# Patient Record
Sex: MALE | Race: ASIAN | Employment: FULL TIME | URBAN - METROPOLITAN AREA
[De-identification: names, ages, dates, MRNs, and addresses within clinical notes are randomized per-mention and may not be internally consistent; named-entity substitution may affect disease eponyms.]

---

## 2019-05-17 ENCOUNTER — APPOINTMENT (OUTPATIENT)
Dept: GENERAL RADIOLOGY | Age: 29
End: 2019-05-17
Payer: COMMERCIAL

## 2019-05-17 ENCOUNTER — APPOINTMENT (OUTPATIENT)
Dept: CT IMAGING | Age: 29
End: 2019-05-17
Payer: COMMERCIAL

## 2019-05-17 ENCOUNTER — HOSPITAL ENCOUNTER (EMERGENCY)
Age: 29
Discharge: HOME | End: 2019-05-18
Attending: EMERGENCY MEDICINE
Payer: COMMERCIAL

## 2019-05-17 VITALS
SYSTOLIC BLOOD PRESSURE: 130 MMHG | DIASTOLIC BLOOD PRESSURE: 69 MMHG | TEMPERATURE: 99.3 F | BODY MASS INDEX: 42.66 KG/M2 | HEART RATE: 94 BPM | WEIGHT: 315 LBS | RESPIRATION RATE: 18 BRPM | OXYGEN SATURATION: 91 % | HEIGHT: 72 IN

## 2019-05-17 DIAGNOSIS — R06.01 ORTHOPNEA: ICD-10-CM

## 2019-05-17 DIAGNOSIS — R06.00 NOCTURNAL DYSPNEA: ICD-10-CM

## 2019-05-17 DIAGNOSIS — R51.9 ACUTE NONINTRACTABLE HEADACHE, UNSPECIFIED HEADACHE TYPE: Primary | ICD-10-CM

## 2019-05-17 DIAGNOSIS — R06.02 SHORTNESS OF BREATH: ICD-10-CM

## 2019-05-17 DIAGNOSIS — R50.9 FEVER, UNSPECIFIED FEVER CAUSE: ICD-10-CM

## 2019-05-17 DIAGNOSIS — R05.9 COUGH: ICD-10-CM

## 2019-05-17 LAB
A/G RATIO: 0.9 (ref 1.1–2.2)
ALBUMIN SERPL-MCNC: 3.5 G/DL (ref 3.4–5)
ALP BLD-CCNC: 72 U/L (ref 40–129)
ALT SERPL-CCNC: 32 U/L (ref 10–40)
ANION GAP SERPL CALCULATED.3IONS-SCNC: 8 MMOL/L (ref 3–16)
APPEARANCE CSF: CLEAR
APPEARANCE CSF: CLEAR
AST SERPL-CCNC: 25 U/L (ref 15–37)
BASOPHILS ABSOLUTE: 0 K/UL (ref 0–0.2)
BASOPHILS RELATIVE PERCENT: 0.2 %
BILIRUB SERPL-MCNC: 0.3 MG/DL (ref 0–1)
BUN BLDV-MCNC: 8 MG/DL (ref 7–20)
CALCIUM SERPL-MCNC: 8.2 MG/DL (ref 8.3–10.6)
CHLORIDE BLD-SCNC: 102 MMOL/L (ref 99–110)
CLOT EVALUATION CSF: ABNORMAL
CLOT EVALUATION CSF: ABNORMAL
CO2: 26 MMOL/L (ref 21–32)
COLOR CSF: COLORLESS
COLOR CSF: COLORLESS
CREAT SERPL-MCNC: <0.5 MG/DL (ref 0.9–1.3)
EOSINOPHILS ABSOLUTE: 0.1 K/UL (ref 0–0.6)
EOSINOPHILS RELATIVE PERCENT: 0.6 %
GFR AFRICAN AMERICAN: >60
GFR NON-AFRICAN AMERICAN: >60
GLOBULIN: 3.8 G/DL
GLUCOSE BLD-MCNC: 149 MG/DL (ref 70–99)
GLUCOSE, CSF: 92 MG/DL (ref 40–80)
HCT VFR BLD CALC: 43.6 % (ref 40.5–52.5)
HEMOGLOBIN: 14.3 G/DL (ref 13.5–17.5)
LYMPHOCYTES ABSOLUTE: 2.1 K/UL (ref 1–5.1)
LYMPHOCYTES RELATIVE PERCENT: 17.9 %
MCH RBC QN AUTO: 30.8 PG (ref 26–34)
MCHC RBC AUTO-ENTMCNC: 32.9 G/DL (ref 31–36)
MCV RBC AUTO: 93.4 FL (ref 80–100)
MONOCYTES ABSOLUTE: 1.2 K/UL (ref 0–1.3)
MONOCYTES RELATIVE PERCENT: 10.2 %
NEUTROPHILS ABSOLUTE: 8.5 K/UL (ref 1.7–7.7)
NEUTROPHILS RELATIVE PERCENT: 71.1 %
NO DIFFERENTIAL CSF: ABNORMAL
NO DIFFERENTIAL CSF: ABNORMAL
PDW BLD-RTO: 14.7 % (ref 12.4–15.4)
PLATELET # BLD: 202 K/UL (ref 135–450)
PMV BLD AUTO: 8.8 FL (ref 5–10.5)
POTASSIUM SERPL-SCNC: 3.9 MMOL/L (ref 3.5–5.1)
PROTEIN CSF: 21 MG/DL (ref 15–45)
RAPID INFLUENZA  B AGN: NEGATIVE
RAPID INFLUENZA A AGN: NEGATIVE
RBC # BLD: 4.66 M/UL (ref 4.2–5.9)
RBC CSF: 2 /CUMM
RBC CSF: 9 /CUMM
SODIUM BLD-SCNC: 136 MMOL/L (ref 136–145)
TOTAL PROTEIN: 7.3 G/DL (ref 6.4–8.2)
TUBE NUMBER CSF: ABNORMAL
VOLUME CSF: 1 ML
WBC # BLD: 11.9 K/UL (ref 4–11)
WBC CSF: 0 /CUMM (ref 0–5)
WBC CSF: 0 /CUMM (ref 0–5)

## 2019-05-17 PROCEDURE — 70450 CT HEAD/BRAIN W/O DYE: CPT

## 2019-05-17 PROCEDURE — 94640 AIRWAY INHALATION TREATMENT: CPT

## 2019-05-17 PROCEDURE — 99284 EMERGENCY DEPT VISIT MOD MDM: CPT

## 2019-05-17 PROCEDURE — 89050 BODY FLUID CELL COUNT: CPT

## 2019-05-17 PROCEDURE — 87070 CULTURE OTHR SPECIMN AEROBIC: CPT

## 2019-05-17 PROCEDURE — 94761 N-INVAS EAR/PLS OXIMETRY MLT: CPT

## 2019-05-17 PROCEDURE — 82945 GLUCOSE OTHER FLUID: CPT

## 2019-05-17 PROCEDURE — 87205 SMEAR GRAM STAIN: CPT

## 2019-05-17 PROCEDURE — 80053 COMPREHEN METABOLIC PANEL: CPT

## 2019-05-17 PROCEDURE — 71046 X-RAY EXAM CHEST 2 VIEWS: CPT

## 2019-05-17 PROCEDURE — 85025 COMPLETE CBC W/AUTO DIFF WBC: CPT

## 2019-05-17 PROCEDURE — 84157 ASSAY OF PROTEIN OTHER: CPT

## 2019-05-17 PROCEDURE — 96360 HYDRATION IV INFUSION INIT: CPT

## 2019-05-17 PROCEDURE — 2580000003 HC RX 258: Performed by: EMERGENCY MEDICINE

## 2019-05-17 PROCEDURE — 6370000000 HC RX 637 (ALT 250 FOR IP): Performed by: EMERGENCY MEDICINE

## 2019-05-17 PROCEDURE — 94664 DEMO&/EVAL PT USE INHALER: CPT

## 2019-05-17 PROCEDURE — 87804 INFLUENZA ASSAY W/OPTIC: CPT

## 2019-05-17 RX ORDER — LIDOCAINE HYDROCHLORIDE 10 MG/ML
30 INJECTION, SOLUTION INFILTRATION; PERINEURAL ONCE
Status: DISCONTINUED | OUTPATIENT
Start: 2019-05-17 | End: 2019-05-18 | Stop reason: HOSPADM

## 2019-05-17 RX ORDER — 0.9 % SODIUM CHLORIDE 0.9 %
1000 INTRAVENOUS SOLUTION INTRAVENOUS ONCE
Status: COMPLETED | OUTPATIENT
Start: 2019-05-17 | End: 2019-05-17

## 2019-05-17 RX ORDER — TRAMADOL HYDROCHLORIDE 50 MG/1
50 TABLET ORAL EVERY 6 HOURS PRN
Qty: 12 TABLET | Refills: 0 | Status: SHIPPED | OUTPATIENT
Start: 2019-05-17 | End: 2019-05-20

## 2019-05-17 RX ORDER — IPRATROPIUM BROMIDE AND ALBUTEROL SULFATE 2.5; .5 MG/3ML; MG/3ML
1 SOLUTION RESPIRATORY (INHALATION) ONCE
Status: COMPLETED | OUTPATIENT
Start: 2019-05-17 | End: 2019-05-17

## 2019-05-17 RX ADMIN — SODIUM CHLORIDE 1000 ML: 9 INJECTION, SOLUTION INTRAVENOUS at 20:51

## 2019-05-17 RX ADMIN — IPRATROPIUM BROMIDE AND ALBUTEROL SULFATE 1 AMPULE: .5; 3 SOLUTION RESPIRATORY (INHALATION) at 21:19

## 2019-05-17 SDOH — HEALTH STABILITY: MENTAL HEALTH: HOW OFTEN DO YOU HAVE A DRINK CONTAINING ALCOHOL?: NEVER

## 2019-05-17 NOTE — LETTER
[facility] Emergency [facility]  [address] [facility] [address]  Phone: [phone]               May 18, 2019    Patient: Nick Lewis   YOB: 1990   Date of Visit: 5/17/2019       To Whom It May Concern:    Nick Lewis was seen and treated in our emergency department on 5/17/2019. He may return to work on 5/19/2019.       Sincerely,       Registered Nurse         Signature:__________________________________

## 2019-05-17 NOTE — ED PROVIDER NOTES
interspace. A 24 gauge spinal needle was inserted through the L4 interspace and 4mL of clear CSF was sent to the lab for analysis. Amanda Toth rested after the procedure. There were no complications during the procedure. Radiology  CT Head WO Contrast   Final Result   No acute intracranial abnormality. XR CHEST STANDARD (2 VW)   Final Result   Cardiomegaly. No radiographic evidence of acute pulmonary disease.              Labs  Results for orders placed or performed during the hospital encounter of 05/17/19   Rapid influenza A/B antigens   Result Value Ref Range    Rapid Influenza A Ag Negative Negative    Rapid Influenza B Ag Negative Negative   CSF culture   Result Value Ref Range    Gram Stain Result       Cytospin performed,no quantitation  WBC's (Polymorphonuclear)  WBC's (Mononuclear)  No organisms seen     CBC Auto Differential   Result Value Ref Range    WBC 11.9 (H) 4.0 - 11.0 K/uL    RBC 4.66 4.20 - 5.90 M/uL    Hemoglobin 14.3 13.5 - 17.5 g/dL    Hematocrit 43.6 40.5 - 52.5 %    MCV 93.4 80.0 - 100.0 fL    MCH 30.8 26.0 - 34.0 pg    MCHC 32.9 31.0 - 36.0 g/dL    RDW 14.7 12.4 - 15.4 %    Platelets 422 697 - 610 K/uL    MPV 8.8 5.0 - 10.5 fL    Neutrophils % 71.1 %    Lymphocytes % 17.9 %    Monocytes % 10.2 %    Eosinophils % 0.6 %    Basophils % 0.2 %    Neutrophils # 8.5 (H) 1.7 - 7.7 K/uL    Lymphocytes # 2.1 1.0 - 5.1 K/uL    Monocytes # 1.2 0.0 - 1.3 K/uL    Eosinophils # 0.1 0.0 - 0.6 K/uL    Basophils # 0.0 0.0 - 0.2 K/uL   Comprehensive Metabolic Panel   Result Value Ref Range    Sodium 136 136 - 145 mmol/L    Potassium 3.9 3.5 - 5.1 mmol/L    Chloride 102 99 - 110 mmol/L    CO2 26 21 - 32 mmol/L    Anion Gap 8 3 - 16    Glucose 149 (H) 70 - 99 mg/dL    BUN 8 7 - 20 mg/dL    CREATININE <0.5 (L) 0.9 - 1.3 mg/dL    GFR Non-African American >60 >60    GFR African American >60 >60    Calcium 8.2 (L) 8.3 - 10.6 mg/dL    Total Protein 7.3 6.4 - 8.2 g/dL    Alb 3.5 3.4 - 5.0 g/dL Albumin/Globulin Ratio 0.9 (L) 1.1 - 2.2    Total Bilirubin 0.3 0.0 - 1.0 mg/dL    Alkaline Phosphatase 72 40 - 129 U/L    ALT 32 10 - 40 U/L    AST 25 15 - 37 U/L    Globulin 3.8 g/dL   CSF Cell Count with Differential   Result Value Ref Range    Color, CSF Colorless Colorless    Appearance, CSF Clear Clear    Tube Number + CELL CT + DIFF-CSF Tube 1     Clot Evaluation CSF see below     WBC, CSF 0 0 - 5 /cumm    RBC, CSF 9 (A) 0 /cumm    No differential CSF see below    CSF Cell Count with Differential   Result Value Ref Range    Color, CSF Colorless Colorless    Appearance, CSF Clear Clear    Tube Number + CELL CT + DIFF-CSF Tube 4     Clot Evaluation CSF see below     WBC, CSF 0 0 - 5 /cumm    RBC, CSF 2 (A) 0 /cumm    No differential CSF see below    Glucose, CSF   Result Value Ref Range    Glucose, CSF 92 (H) 40 - 80 mg/dL    Tube Number + CELL CT + DIFF-CSF Tube 2     Volume Csf 1 mL   Protein, CSF   Result Value Ref Range    Protein, CSF 21 15 - 45 mg/dL       MDM and ED Course  The patient's symptoms seem most consistent with a viral infection and pneumonia, although the headache was most prominent in his history. With describing it as the worst headache of his life, we discussed serious possibilities such as meningitis and subarachnoid hemorrhage. After discussing the risks, benefits, and alternatives to performing LP along with CT of his head, the patient decided that he would want a full workup, including a lumbar puncture. Consent was verified with the nurse and the LP was performed as indicated above. Although the patient's pain was significant, he declined pain medication throughout his visit multiple times. Reportedly, his workup. It was normal.  His CT head was without acute abnormality. His LP showed no evidence of meningitis, intracranial hemorrhage, or other serious diagnosis. Of note, the patient did develop hypoxia multiple times during his visit.   This seemed mostly to occur whenever he was lying flat. Given his history, I suspect sleep apnea. The patient also suspects that he has a somewhat like to see a sleep doctor. I placed a referral for this today. Whenever I would sit the patient up, his oxygen level would improve. Given the remainder of his workup showed no evidence of serious respiratory disease, believe is unlikely that he has another cause. I estimate there is LOW risk for SUBARACHNOID HEMORRHAGE, BACTERIAL MENINGITIS, INTRACRANIAL HEMORRHAGE, SUBDURAL HEMATOMA, SKULL FRACTURE, TIA, STROKE, SEPSIS, PNEUMONIA, CELLULITIS, PERITONITIS/APPENDICITIS, MYOCARDITIS, ENDOCARDITIS, and other serious infection or cause of headache, thus I consider the discharge disposition reasonable. Nano Pichardo and I have discussed the diagnosis and risks, and we agree with discharging home to follow-up with their primary doctor. We also discussed returning to the Emergency Department immediately if new or worsening symptoms occur. We have discussed the symptoms which are most concerning (e.g., changing or worsening pain, weakness, vomiting, fever, shortness of breath, chest pain, etc) that necessitate immediate return. Final Impression  1. Acute nonintractable headache, unspecified headache type    2. Cough    3. Shortness of breath    4. Fever, unspecified fever cause    5. Nocturnal dyspnea    6. Orthopnea        Blood pressure 130/69, pulse 94, temperature 99.3 °F (37.4 °C), resp. rate 18, height 6' (1.829 m), weight (!) 365 lb (165.6 kg), SpO2 91 %.      Disposition:  Discharge    Patient Referrals:  Ritesh Lizarraga MD  [address]  [phone]    Schedule an appointment as soon as possible for a visit   Sleep Medicine for evaluation for sleep apnea    [address] [place] [address]  In 2 days  You will receive a call to assist you, in setting up a primary care physician, for re-evaluation    [facility] [address] [facility]  [phone]    As needed, If symptoms worsen or new symptoms develop      Discharge Medications:  New Prescriptions    TRAMADOL (ULTRAM) 50 MG TABLET    Take 1 tablet by mouth every 6 hours as needed for Pain for up to 3 days. This chart was generated using the [address] [place] [address] [employer] system. I created this record but it may contain dictation errors given the limitations of this technology.         Everet Frankel, MD  05/18/19 0870

## 2019-05-18 NOTE — ED NOTES
Discharge instructions reviewed with pt. Pt verbalized understanding. Peripheral IV removed without complications. Pt given copy of discharge instructions.      Raad Louis RN  05/18/19 6635

## 2019-05-18 NOTE — ED NOTES
Lumbar puncture consent signed, pt LP done by Dr. Iqra Christensen. Pt CSF fluid labs taken over to lab by [facility].       Edna Dempsey RN  05/17/19 5127

## 2019-05-23 ENCOUNTER — APPOINTMENT (OUTPATIENT)
Dept: GENERAL RADIOLOGY | Age: 29
End: 2019-05-23
Payer: COMMERCIAL

## 2019-05-23 ENCOUNTER — HOSPITAL ENCOUNTER (EMERGENCY)
Age: 29
Discharge: HOME | End: 2019-05-23
Attending: EMERGENCY MEDICINE
Payer: COMMERCIAL

## 2019-05-23 VITALS
TEMPERATURE: 97.7 F | WEIGHT: 315 LBS | RESPIRATION RATE: 20 BRPM | HEART RATE: 95 BPM | OXYGEN SATURATION: 90 % | SYSTOLIC BLOOD PRESSURE: 117 MMHG | DIASTOLIC BLOOD PRESSURE: 62 MMHG | BODY MASS INDEX: 42.66 KG/M2 | HEIGHT: 72 IN

## 2019-05-23 DIAGNOSIS — R83.5: Primary | ICD-10-CM

## 2019-05-23 DIAGNOSIS — R05.9 COUGH: ICD-10-CM

## 2019-05-23 LAB
ANION GAP SERPL CALCULATED.3IONS-SCNC: 11 MMOL/L (ref 3–16)
BASOPHILS ABSOLUTE: 0 K/UL (ref 0–0.2)
BASOPHILS RELATIVE PERCENT: 0 %
BUN BLDV-MCNC: 7 MG/DL (ref 7–20)
CALCIUM SERPL-MCNC: 9 MG/DL (ref 8.3–10.6)
CHLORIDE BLD-SCNC: 99 MMOL/L (ref 99–110)
CO2: 27 MMOL/L (ref 21–32)
CREAT SERPL-MCNC: 0.9 MG/DL (ref 0.9–1.3)
CSF CULTURE: ABNORMAL
CSF CULTURE: ABNORMAL
EOSINOPHILS ABSOLUTE: 0.2 K/UL (ref 0–0.6)
EOSINOPHILS RELATIVE PERCENT: 2 %
GFR AFRICAN AMERICAN: >60
GFR NON-AFRICAN AMERICAN: >60
GLUCOSE BLD-MCNC: 183 MG/DL (ref 70–99)
GRAM STAIN RESULT: ABNORMAL
HCT VFR BLD CALC: 46.9 % (ref 40.5–52.5)
HEMOGLOBIN: 15.3 G/DL (ref 13.5–17.5)
LACTIC ACID: 1.9 MMOL/L (ref 0.4–2)
LYMPHOCYTES ABSOLUTE: 4.7 K/UL (ref 1–5.1)
LYMPHOCYTES RELATIVE PERCENT: 40 %
MCH RBC QN AUTO: 30.5 PG (ref 26–34)
MCHC RBC AUTO-ENTMCNC: 32.7 G/DL (ref 31–36)
MCV RBC AUTO: 93.2 FL (ref 80–100)
MONOCYTES ABSOLUTE: 0.4 K/UL (ref 0–1.3)
MONOCYTES RELATIVE PERCENT: 3 %
NEUTROPHILS ABSOLUTE: 6.5 K/UL (ref 1.7–7.7)
NEUTROPHILS RELATIVE PERCENT: 55 %
ORGANISM: ABNORMAL
PDW BLD-RTO: 14.5 % (ref 12.4–15.4)
PLATELET # BLD: 290 K/UL (ref 135–450)
PLATELET SLIDE REVIEW: ADEQUATE
PMV BLD AUTO: 8.5 FL (ref 5–10.5)
POTASSIUM SERPL-SCNC: 4.7 MMOL/L (ref 3.5–5.1)
RBC # BLD: 5.03 M/UL (ref 4.2–5.9)
RBC # BLD: NORMAL 10*6/UL
SODIUM BLD-SCNC: 137 MMOL/L (ref 136–145)
WBC # BLD: 11.8 K/UL (ref 4–11)

## 2019-05-23 PROCEDURE — 99283 EMERGENCY DEPT VISIT LOW MDM: CPT

## 2019-05-23 PROCEDURE — 87040 BLOOD CULTURE FOR BACTERIA: CPT

## 2019-05-23 PROCEDURE — 6360000002 HC RX W HCPCS: Performed by: EMERGENCY MEDICINE

## 2019-05-23 PROCEDURE — 80048 BASIC METABOLIC PNL TOTAL CA: CPT

## 2019-05-23 PROCEDURE — 85025 COMPLETE CBC W/AUTO DIFF WBC: CPT

## 2019-05-23 PROCEDURE — 2580000003 HC RX 258: Performed by: EMERGENCY MEDICINE

## 2019-05-23 PROCEDURE — 71046 X-RAY EXAM CHEST 2 VIEWS: CPT

## 2019-05-23 PROCEDURE — 96374 THER/PROPH/DIAG INJ IV PUSH: CPT

## 2019-05-23 PROCEDURE — 96375 TX/PRO/DX INJ NEW DRUG ADDON: CPT

## 2019-05-23 PROCEDURE — 83605 ASSAY OF LACTIC ACID: CPT

## 2019-05-23 RX ORDER — AZITHROMYCIN 250 MG/1
TABLET, FILM COATED ORAL
Qty: 1 PACKET | Refills: 0 | Status: SHIPPED | OUTPATIENT
Start: 2019-05-23 | End: 2019-06-02

## 2019-05-23 RX ADMIN — ACYCLOVIR SODIUM 775 MG: 50 INJECTION, SOLUTION INTRAVENOUS at 18:18

## 2019-05-23 RX ADMIN — DEXTROSE MONOHYDRATE 4 MILLION UNITS: 50 INJECTION, SOLUTION INTRAVENOUS at 15:52

## 2019-05-23 NOTE — ED NOTES
Pharmacy Medication History Note      List of current medications patient is taking is complete. Source of information: Patient    Changes made to medication list:  Medications flagged for removal (include reason, ex. noncompliance):  none    Medications removed (include reason, ex. therapy complete or physician discontinued):  none    Medications added/doses adjusted:  none    Other notes (ex. Recent course of antibiotics, Coumadin dosing):  Denies use of other OTC or herbal medications. Last dose times updated. Sarah Temple, PharmD  ED Pharmacist    No current facility-administered medications on file prior to encounter. No current outpatient medications on file prior to encounter.

## 2019-05-23 NOTE — ED NOTES
Pt d/c instructions given, v/u. IV removed without complications. A&O with no signs of distress. Denies needs at this time. Pt ambulated to exit.         Amairani Larson RN  05/23/19 7163

## 2019-05-23 NOTE — ED PROVIDER NOTES
[address] PROVIDER NOTE    Patient Identification  Pt Name: Doris Castro  MRN: [id number]  Leatha 1990  Date of evaluation: 5/23/2019  Provider: Sena Jimenez MD  PCP: No primary care provider on file. Chief Complaint  Other (Pt is a call back due to positive CSF culture. Pt reports he is feeling better. Pt denies complaint. Mask applied when pt arrived.  )      HPI  (History provided by patient)  This is a 29 y.o. male who was brought in by self After being called and told that his CSF culture was positive earlier today. I had seen him on his previous visit to our emergency department on 5/17. At that time, he had a headache and fever of 103 (prior to arriving). As part of his work up, I performed LP to rule out SAH and meningitis/encephalitis. At that time, his cell count and gram stain did not suggest bacterial meningitis, so I discharged him home. He states his symptoms persisted since discharged, so he followed up at urgent care. Since that visit, he has been improving. His symptoms are much milder now. He denies headache, body aches, fever, and vomiting. He has had persistent cough and mild trouble breathing. ROS  10 systems reviewed, pertinent positives/negatives per HPI otherwise noted to be negative. I have reviewed the following nursing documentation:  Allergies: Patient has no known allergies. Past medical history: History reviewed. No pertinent past medical history. Past surgical history: History reviewed. No pertinent surgical history. Home medications:   Discharge Medication List as of 5/23/2019  6:46 PM          Social history:  reports that he has never smoked. He has never used smokeless tobacco. He reports that he does not drink alcohol or use drugs. Family history:  History reviewed. No pertinent family history.       Exam  ED Triage Vitals [05/23/19 1459]   BP Temp Temp Source Pulse Resp SpO2 Height Weight   136/85 97.7 °F (36.5 °C) - 16    Glucose 183 (H) 70 - 99 mg/dL    BUN 7 7 - 20 mg/dL    CREATININE 0.9 0.9 - 1.3 mg/dL    GFR Non-African American >60 >60    GFR African American >60 >60    Calcium 9.0 8.3 - 10.6 mg/dL   Lactic acid, plasma   Result Value Ref Range    Lactic Acid 1.9 0.4 - 2.0 mmol/L      MDM and ED Course  By the time the patient had first arrived, pharmacy and Dr. Arianna Almeida discussed the patient's previous results and reviewed them. The bacteria involved, cutibacterium acnes, is only rarely associated with meningitis, but it is possible. Pharmacy had suggested PenG and acyclovir (as patients protein and glucose were more consistent with a viral meningitis). On my exam, the patient appeared much better than my previous evaluation. Further, his labs were excellent today with only mild leukocytosis. He remained afebrile. He did have mild hypoxia, but this was also seen on previous visit. He is a very large, obese man, and we discussed this previously. We suspect he may have sleep apnea. I consulted with infectious disease, Dr. Floridalma Boudreaux, who explained that this bacteria is likely a contaminant. He did not believe the patent needed admission or antibiotics, particularly since the patient was improving. He agreed with blood cultures. To confirm. Given the patient's mild leukocytosis, persistent cough, and findings possibly suggestive of pneumonia on chest x-ray, I am starting the patient on azithromycin. I estimate there is LOW risk for MENINGITIS, ENCEPHALITIS, SEPSIS/SEPTIC SHOCK, SUBARACHNOID HEMORRHAGE, PNEUMOTHORAX, HEMOTHORAX, AND OTHER SERIOUS CAUSES OF INFECTION, thus I consider the discharge disposition reasonable. Nano Pichardo and I have discussed the diagnosis and risks, and we agree with discharging home to follow-up with their primary doctor. We also discussed returning to the Emergency Department immediately if new or worsening symptoms occur.  We have discussed the symptoms which are most concerning that necessitate immediate return. Final Impression  1. Abnormal microbiological findings in CSF    2. Cough        Blood pressure 117/62, pulse 95, temperature 97.7 °F (36.5 °C), temperature source Infrared, resp. rate 20, height 6' (1.829 m), weight (!) 365 lb (165.6 kg), SpO2 90 %. Disposition:  Discharge      Patient Referrals:  [facility]  [address] [facility]  [phone]    As needed, If symptoms worsen or new symptoms develop    [facility]  [address] [facility]  [phone]    As needed, If symptoms worsen or new symptoms develop    [facility]) Pre-Services  [phone]          Discharge Medications:  Discharge Medication List as of 5/23/2019  6:46 PM      START taking these medications    Details   azithromycin (ZITHROMAX) 250 MG tablet Take 2 tablets (500 mg) on Day 1, followed by 1 tablet (250 mg) once daily on Days 2 through 5., Disp-1 packet, R-0Print             Discontinued Medications:  Discharge Medication List as of 5/23/2019  6:46 PM          This chart was generated using the [employer] dictation system. I created this record but it may contain dictation errors given the limitations of this technology.         Bianca Velasco MD  06/07/19 9553

## 2019-05-23 NOTE — ED NOTES
Positive CSF cx called over from microbiology to charge nurse, reviewed by Dr. Donta Webber, I spoke with pt and he will return to the Emergency department.        Tami Paige  05/23/19 1148

## 2019-05-28 LAB
BLOOD CULTURE, ROUTINE: NORMAL
CULTURE, BLOOD 2: NORMAL

## 2019-07-06 ENCOUNTER — HOSPITAL ENCOUNTER (EMERGENCY)
Age: 29
Discharge: HOME | End: 2019-07-06
Payer: COMMERCIAL

## 2019-07-06 VITALS
HEART RATE: 98 BPM | DIASTOLIC BLOOD PRESSURE: 81 MMHG | TEMPERATURE: 98 F | OXYGEN SATURATION: 92 % | HEIGHT: 71 IN | RESPIRATION RATE: 20 BRPM | BODY MASS INDEX: 44.1 KG/M2 | WEIGHT: 315 LBS | SYSTOLIC BLOOD PRESSURE: 136 MMHG

## 2019-07-06 DIAGNOSIS — L50.9 URTICARIAL RASH: Primary | ICD-10-CM

## 2019-07-06 PROCEDURE — 99282 EMERGENCY DEPT VISIT SF MDM: CPT

## 2019-07-06 PROCEDURE — 6370000000 HC RX 637 (ALT 250 FOR IP): Performed by: PHYSICIAN ASSISTANT

## 2019-07-06 RX ORDER — DIPHENHYDRAMINE HCL 25 MG
25 CAPSULE ORAL EVERY 4 HOURS PRN
Qty: 25 CAPSULE | Refills: 0 | Status: SHIPPED | OUTPATIENT
Start: 2019-07-06 | End: 2019-07-16

## 2019-07-06 RX ORDER — PREDNISONE 20 MG/1
60 TABLET ORAL ONCE
Status: COMPLETED | OUTPATIENT
Start: 2019-07-06 | End: 2019-07-06

## 2019-07-06 RX ORDER — FAMOTIDINE 20 MG/1
20 TABLET, FILM COATED ORAL 2 TIMES DAILY
Qty: 20 TABLET | Refills: 0 | Status: SHIPPED | OUTPATIENT
Start: 2019-07-06 | End: 2019-07-16

## 2019-07-06 RX ORDER — DIPHENHYDRAMINE HCL 25 MG
25 TABLET ORAL ONCE
Status: COMPLETED | OUTPATIENT
Start: 2019-07-06 | End: 2019-07-06

## 2019-07-06 RX ORDER — FAMOTIDINE 20 MG/1
20 TABLET, FILM COATED ORAL ONCE
Status: COMPLETED | OUTPATIENT
Start: 2019-07-06 | End: 2019-07-06

## 2019-07-06 RX ORDER — PREDNISONE 20 MG/1
TABLET ORAL
Qty: 18 TABLET | Refills: 0 | Status: SHIPPED | OUTPATIENT
Start: 2019-07-06 | End: 2019-07-16

## 2019-07-06 RX ADMIN — PREDNISONE 60 MG: 20 TABLET ORAL at 08:27

## 2019-07-06 RX ADMIN — DIPHENHYDRAMINE HCL 25 MG: 25 TABLET ORAL at 08:27

## 2019-07-06 RX ADMIN — FAMOTIDINE 20 MG: 20 TABLET, FILM COATED ORAL at 08:27

## 2019-07-06 ASSESSMENT — ENCOUNTER SYMPTOMS
RESPIRATORY NEGATIVE: 1
SHORTNESS OF BREATH: 0
CHOKING: 0
VOICE CHANGE: 0
CHEST TIGHTNESS: 0
ABDOMINAL PAIN: 0
COLOR CHANGE: 0
NAUSEA: 0
SORE THROAT: 0
COUGH: 0
STRIDOR: 0
VOMITING: 0
APNEA: 0
WHEEZING: 0
TROUBLE SWALLOWING: 0

## 2019-07-06 NOTE — ED PROVIDER NOTES
abdominal pain, nausea and vomiting. Genitourinary: Negative for difficulty urinating and dysuria. Musculoskeletal: Negative for arthralgias, myalgias, neck pain and neck stiffness. Skin: Positive for rash. Negative for color change, pallor and wound. Neurological: Negative for dizziness, light-headedness and headaches. Positives and Pertinent negatives as per HPI. Except as noted abovein the ROS, all other systems were reviewed and negative. PAST MEDICAL HISTORY   History reviewed. No pertinent past medical history. SURGICAL HISTORY   History reviewed. No pertinent surgical history. [address]       Discharge Medication List as of 7/6/2019  8:23 AM            ALLERGIES     Patient has no known allergies. FAMILYHISTORY     History reviewed. No pertinent family history.        SOCIAL HISTORY       Social History     Socioeconomic History    Marital status:      Spouse name: None    Number of children: None    Years of education: None    Highest education level: None   Occupational History    None   Social Needs    Financial resource strain: None    Food insecurity:     Worry: None     Inability: None    Transportation needs:     Medical: None     Non-medical: None   Tobacco Use    Smoking status: Never Smoker    Smokeless tobacco: Never Used   Substance and Sexual Activity    Alcohol use: Never     Frequency: Never    Drug use: Never    Sexual activity: None   Lifestyle    Physical activity:     Days per week: None     Minutes per session: None    Stress: None   Relationships    Social connections:     Talks on phone: None     Gets together: None     Attends Orthodox service: None     Active member of club or organization: None     Attends meetings of clubs or organizations: None     Relationship status: None    Intimate partner violence:     Fear of current or ex partner: None     Emotionally abused: None     Physically abused: None     Forced sexual capsule Take 1 capsule by mouth every 4 hours as needed for Itching, Disp-25 capsule, R-0Print      famotidine (PEPCID) 20 MG tablet Take 1 tablet by mouth 2 times daily for 10 days, Disp-20 tablet, R-0Print      predniSONE (DELTASONE) 20 MG tablet 3 tabs po qam for 3 days then 2 tabs qam for 3 days the 1 tab qam for 3 days, Disp-18 tablet, R-0Print             DISCONTINUED MEDICATIONS:  Discharge Medication List as of 7/6/2019  8:23 AM                 (Please note that portions ofthis note were completed with a voice recognition program.  Efforts were made to edit the dictations but occasionally words are mis-transcribed.)    Maude Fothergill, PA (electronically signed)          BRYN Gleason  07/06/19 0761

## 2020-07-06 ENCOUNTER — OFFICE VISIT (OUTPATIENT)
Dept: PRIMARY CARE CLINIC | Age: 30
End: 2020-07-06
Payer: COMMERCIAL

## 2020-07-06 PROCEDURE — 99211 OFF/OP EST MAY X REQ PHY/QHP: CPT | Performed by: NURSE PRACTITIONER

## 2020-07-06 NOTE — PATIENT INSTRUCTIONS

## 2020-07-09 LAB
SARS-COV-2: NOT DETECTED
SOURCE: NORMAL